# Patient Record
Sex: FEMALE | Race: WHITE | Employment: FULL TIME | URBAN - METROPOLITAN AREA
[De-identification: names, ages, dates, MRNs, and addresses within clinical notes are randomized per-mention and may not be internally consistent; named-entity substitution may affect disease eponyms.]

---

## 2022-12-28 ENCOUNTER — HOSPITAL ENCOUNTER (EMERGENCY)
Age: 22
Discharge: HOME | End: 2022-12-28
Attending: EMERGENCY MEDICINE
Payer: COMMERCIAL

## 2022-12-28 ENCOUNTER — APPOINTMENT (OUTPATIENT)
Dept: GENERAL RADIOLOGY | Age: 22
End: 2022-12-28
Payer: COMMERCIAL

## 2022-12-28 VITALS
HEART RATE: 84 BPM | DIASTOLIC BLOOD PRESSURE: 74 MMHG | HEIGHT: 64 IN | RESPIRATION RATE: 18 BRPM | BODY MASS INDEX: 33.57 KG/M2 | WEIGHT: 196.6 LBS | OXYGEN SATURATION: 100 % | TEMPERATURE: 98.5 F | SYSTOLIC BLOOD PRESSURE: 125 MMHG

## 2022-12-28 DIAGNOSIS — M25.512 ACUTE PAIN OF LEFT SHOULDER: Primary | ICD-10-CM

## 2022-12-28 DIAGNOSIS — M85.60 BONE CYST: ICD-10-CM

## 2022-12-28 PROCEDURE — 6370000000 HC RX 637 (ALT 250 FOR IP): Performed by: EMERGENCY MEDICINE

## 2022-12-28 PROCEDURE — 99283 EMERGENCY DEPT VISIT LOW MDM: CPT

## 2022-12-28 PROCEDURE — 73030 X-RAY EXAM OF SHOULDER: CPT

## 2022-12-28 RX ORDER — HYDROCODONE BITARTRATE AND ACETAMINOPHEN 5; 325 MG/1; MG/1
1 TABLET ORAL
Status: COMPLETED | OUTPATIENT
Start: 2022-12-28 | End: 2022-12-28

## 2022-12-28 RX ORDER — HYDROCODONE BITARTRATE AND ACETAMINOPHEN 5; 325 MG/1; MG/1
1 TABLET ORAL 2 TIMES DAILY PRN
Qty: 6 TABLET | Refills: 0 | Status: SHIPPED | OUTPATIENT
Start: 2022-12-28 | End: 2022-12-31

## 2022-12-28 RX ADMIN — HYDROCODONE BITARTRATE AND ACETAMINOPHEN 1 TABLET: 5; 325 TABLET ORAL at 13:47

## 2022-12-28 ASSESSMENT — PAIN - FUNCTIONAL ASSESSMENT: PAIN_FUNCTIONAL_ASSESSMENT: 0-10

## 2022-12-28 ASSESSMENT — ENCOUNTER SYMPTOMS
SHORTNESS OF BREATH: 0
VOMITING: 0

## 2022-12-28 ASSESSMENT — PAIN SCALES - GENERAL: PAINLEVEL_OUTOF10: 5
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
## 2022-12-28 NOTE — ED NOTES
I have reviewed discharge instructions with the patient. The patient verbalized understanding. Patient left ED via Discharge Method: ambulatory to Home with family. Opportunity for questions and clarification provided. Patient given 1 scripts. To continue your aftercare when you leave the hospital, you may receive an automated call from our care team to check in on how you are doing. This is a free service and part of our promise to provide the best care and service to meet your aftercare needs.  If you have questions, or wish to unsubscribe from this service please call [phone]. Thank you for Choosing our [facility] Emergency Department.         Anna Jacome RN  12/28/22 8286

## 2022-12-29 ENCOUNTER — APPOINTMENT (OUTPATIENT)
Dept: GENERAL RADIOLOGY | Age: 22
End: 2022-12-29
Payer: COMMERCIAL

## 2022-12-29 ENCOUNTER — HOSPITAL ENCOUNTER (EMERGENCY)
Age: 22
Discharge: HOME | End: 2022-12-30
Attending: EMERGENCY MEDICINE | Admitting: EMERGENCY MEDICINE
Payer: COMMERCIAL

## 2022-12-29 DIAGNOSIS — M89.9 LESION OF HUMERUS: Primary | ICD-10-CM

## 2022-12-29 LAB
ALBUMIN SERPL-MCNC: 3.8 G/DL (ref 3.5–5)
ALBUMIN/GLOB SERPL: 1 {RATIO} (ref 0.4–1.6)
ALP SERPL-CCNC: 83 U/L (ref 50–136)
ALT SERPL-CCNC: 21 U/L (ref 12–65)
ANION GAP SERPL CALC-SCNC: 5 MMOL/L (ref 2–11)
AST SERPL-CCNC: 13 U/L (ref 15–37)
BASOPHILS # BLD: 0 K/UL (ref 0–0.2)
BASOPHILS NFR BLD: 0 % (ref 0–2)
BILIRUB SERPL-MCNC: 0.3 MG/DL (ref 0.2–1.1)
BUN SERPL-MCNC: 12 MG/DL (ref 6–23)
CALCIUM SERPL-MCNC: 9.7 MG/DL (ref 8.3–10.4)
CHLORIDE SERPL-SCNC: 105 MMOL/L (ref 101–110)
CO2 SERPL-SCNC: 30 MMOL/L (ref 21–32)
CREAT SERPL-MCNC: 0.8 MG/DL (ref 0.6–1)
DIFFERENTIAL METHOD BLD: ABNORMAL
EOSINOPHIL # BLD: 0.2 K/UL (ref 0–0.8)
EOSINOPHIL NFR BLD: 1 % (ref 0.5–7.8)
ERYTHROCYTE [DISTWIDTH] IN BLOOD BY AUTOMATED COUNT: 12.9 % (ref 11.9–14.6)
FLUAV AG NPH QL IA: NEGATIVE
FLUBV AG NPH QL IA: NEGATIVE
GLOBULIN SER CALC-MCNC: 3.9 G/DL (ref 2.8–4.5)
GLUCOSE SERPL-MCNC: 102 MG/DL (ref 65–100)
HCT VFR BLD AUTO: 39.2 % (ref 35.8–46.3)
HGB BLD-MCNC: 13.1 G/DL (ref 11.7–15.4)
IMM GRANULOCYTES # BLD AUTO: 0.1 K/UL (ref 0–0.5)
IMM GRANULOCYTES NFR BLD AUTO: 1 % (ref 0–5)
LACTATE SERPL-SCNC: 0.9 MMOL/L (ref 0.4–2)
LYMPHOCYTES # BLD: 2.3 K/UL (ref 0.5–4.6)
LYMPHOCYTES NFR BLD: 17 % (ref 13–44)
MCH RBC QN AUTO: 28.7 PG (ref 26.1–32.9)
MCHC RBC AUTO-ENTMCNC: 33.4 G/DL (ref 31.4–35)
MCV RBC AUTO: 86 FL (ref 82–102)
MONOCYTES # BLD: 1.3 K/UL (ref 0.1–1.3)
MONOCYTES NFR BLD: 10 % (ref 4–12)
NEUTS SEG # BLD: 10 K/UL (ref 1.7–8.2)
NEUTS SEG NFR BLD: 71 % (ref 43–78)
NRBC # BLD: 0 K/UL (ref 0–0.2)
PLATELET # BLD AUTO: 377 K/UL (ref 150–450)
PMV BLD AUTO: 9.8 FL (ref 9.4–12.3)
POTASSIUM SERPL-SCNC: 4 MMOL/L (ref 3.5–5.1)
PROCALCITONIN SERPL-MCNC: <0.05 NG/ML (ref 0–0.49)
PROT SERPL-MCNC: 7.7 G/DL (ref 6.3–8.2)
RBC # BLD AUTO: 4.56 M/UL (ref 4.05–5.2)
SARS-COV-2 RDRP RESP QL NAA+PROBE: NOT DETECTED
SODIUM SERPL-SCNC: 140 MMOL/L (ref 133–143)
SOURCE: NORMAL
SPECIMEN SOURCE: NORMAL
WBC # BLD AUTO: 14 K/UL (ref 4.3–11.1)

## 2022-12-29 PROCEDURE — 87635 SARS-COV-2 COVID-19 AMP PRB: CPT

## 2022-12-29 PROCEDURE — 71045 X-RAY EXAM CHEST 1 VIEW: CPT

## 2022-12-29 PROCEDURE — 87804 INFLUENZA ASSAY W/OPTIC: CPT

## 2022-12-29 PROCEDURE — 99285 EMERGENCY DEPT VISIT HI MDM: CPT

## 2022-12-29 PROCEDURE — 84145 PROCALCITONIN (PCT): CPT

## 2022-12-29 PROCEDURE — 86140 C-REACTIVE PROTEIN: CPT

## 2022-12-29 PROCEDURE — 87040 BLOOD CULTURE FOR BACTERIA: CPT

## 2022-12-29 PROCEDURE — 83605 ASSAY OF LACTIC ACID: CPT

## 2022-12-29 PROCEDURE — 96374 THER/PROPH/DIAG INJ IV PUSH: CPT

## 2022-12-29 PROCEDURE — 85025 COMPLETE CBC W/AUTO DIFF WBC: CPT

## 2022-12-29 PROCEDURE — 80053 COMPREHEN METABOLIC PANEL: CPT

## 2022-12-29 PROCEDURE — 93005 ELECTROCARDIOGRAM TRACING: CPT

## 2022-12-29 PROCEDURE — 96376 TX/PRO/DX INJ SAME DRUG ADON: CPT

## 2022-12-29 NOTE — Clinical Note
Shana Son was seen and treated in our emergency department on 12/29/2022. She may return to work on 01/05/2023. If you have any questions or concerns, please don't hesitate to call.       Ruddy Sam MD

## 2022-12-30 ENCOUNTER — APPOINTMENT (OUTPATIENT)
Dept: GENERAL RADIOLOGY | Age: 22
End: 2022-12-30
Payer: COMMERCIAL

## 2022-12-30 ENCOUNTER — APPOINTMENT (OUTPATIENT)
Dept: MRI IMAGING | Age: 22
End: 2022-12-30
Payer: COMMERCIAL

## 2022-12-30 VITALS
HEART RATE: 108 BPM | SYSTOLIC BLOOD PRESSURE: 131 MMHG | TEMPERATURE: 99.2 F | OXYGEN SATURATION: 96 % | DIASTOLIC BLOOD PRESSURE: 74 MMHG | RESPIRATION RATE: 16 BRPM

## 2022-12-30 LAB
APPEARANCE UR: CLEAR
BACTERIA URNS QL MICRO: 0 /HPF
BILIRUB UR QL: NEGATIVE
BILIRUB UR QL: NEGATIVE
COLOR UR: ABNORMAL
CRP SERPL-MCNC: 15.7 MG/DL (ref 0–0.9)
EKG ATRIAL RATE: 112 BPM
EKG DIAGNOSIS: NORMAL
EKG P AXIS: 51 DEGREES
EKG P-R INTERVAL: 150 MS
EKG Q-T INTERVAL: 304 MS
EKG QRS DURATION: 88 MS
EKG QTC CALCULATION (BAZETT): 414 MS
EKG R AXIS: 51 DEGREES
EKG T AXIS: 19 DEGREES
EKG VENTRICULAR RATE: 112 BPM
EPI CELLS #/AREA URNS HPF: ABNORMAL /HPF
GLUCOSE UR QL STRIP.AUTO: NEGATIVE MG/DL
GLUCOSE UR STRIP.AUTO-MCNC: NEGATIVE MG/DL
HGB UR QL STRIP: ABNORMAL
KETONES UR QL STRIP.AUTO: ABNORMAL MG/DL
KETONES UR-MCNC: ABNORMAL MG/DL
LEUKOCYTE ESTERASE UR QL STRIP.AUTO: NEGATIVE
LEUKOCYTE ESTERASE UR QL STRIP: NEGATIVE
NITRITE UR QL STRIP.AUTO: NEGATIVE
NITRITE UR QL: NEGATIVE
OTHER OBSERVATIONS: ABNORMAL
PH UR STRIP: 5.5 [PH] (ref 5–9)
PH UR: 5.5 [PH] (ref 5–9)
PROT UR QL: NEGATIVE MG/DL
PROT UR STRIP-MCNC: NEGATIVE MG/DL
RBC # UR STRIP: ABNORMAL /UL
SERVICE CMNT-IMP: ABNORMAL
SP GR UR REFRACTOMETRY: 1.01 (ref 1–1.02)
SP GR UR: 1.02 (ref 1–1.02)
UROBILINOGEN UR QL STRIP.AUTO: 0.2 EU/DL (ref 0.2–1)
UROBILINOGEN UR QL: 0.2 EU/DL (ref 0.2–1)
WBC URNS QL MICRO: ABNORMAL /HPF

## 2022-12-30 PROCEDURE — 6370000000 HC RX 637 (ALT 250 FOR IP)

## 2022-12-30 PROCEDURE — 2580000003 HC RX 258

## 2022-12-30 PROCEDURE — 6360000004 HC RX CONTRAST MEDICATION

## 2022-12-30 PROCEDURE — 73223 MRI JOINT UPR EXTR W/O&W/DYE: CPT

## 2022-12-30 PROCEDURE — 81001 URINALYSIS AUTO W/SCOPE: CPT

## 2022-12-30 PROCEDURE — 81003 URINALYSIS AUTO W/O SCOPE: CPT

## 2022-12-30 PROCEDURE — 73030 X-RAY EXAM OF SHOULDER: CPT

## 2022-12-30 PROCEDURE — A9579 GAD-BASE MR CONTRAST NOS,1ML: HCPCS

## 2022-12-30 PROCEDURE — 6360000002 HC RX W HCPCS

## 2022-12-30 RX ORDER — SODIUM CHLORIDE, SODIUM LACTATE, POTASSIUM CHLORIDE, AND CALCIUM CHLORIDE .6; .31; .03; .02 G/100ML; G/100ML; G/100ML; G/100ML
1000 INJECTION, SOLUTION INTRAVENOUS ONCE
Status: COMPLETED | OUTPATIENT
Start: 2022-12-30 | End: 2022-12-30

## 2022-12-30 RX ORDER — HYDROCODONE BITARTRATE AND ACETAMINOPHEN 7.5; 325 MG/1; MG/1
1 TABLET ORAL
Status: COMPLETED | OUTPATIENT
Start: 2022-12-30 | End: 2022-12-30

## 2022-12-30 RX ORDER — MORPHINE SULFATE 2 MG/ML
2 INJECTION, SOLUTION INTRAMUSCULAR; INTRAVENOUS
Status: COMPLETED | OUTPATIENT
Start: 2022-12-30 | End: 2022-12-30

## 2022-12-30 RX ORDER — HYDROCODONE BITARTRATE AND ACETAMINOPHEN 7.5; 325 MG/1; MG/1
1 TABLET ORAL EVERY 6 HOURS PRN
Qty: 12 TABLET | Refills: 0 | Status: SHIPPED | OUTPATIENT
Start: 2022-12-30 | End: 2023-01-02

## 2022-12-30 RX ADMIN — HYDROCODONE BITARTRATE AND ACETAMINOPHEN 1 TABLET: 7.5; 325 TABLET ORAL at 03:01

## 2022-12-30 RX ADMIN — SODIUM CHLORIDE, POTASSIUM CHLORIDE, SODIUM LACTATE AND CALCIUM CHLORIDE 1000 ML: 600; 310; 30; 20 INJECTION, SOLUTION INTRAVENOUS at 03:02

## 2022-12-30 RX ADMIN — GADOTERIDOL 18 ML: 279.3 INJECTION, SOLUTION INTRAVENOUS at 13:00

## 2022-12-30 RX ADMIN — MORPHINE SULFATE 2 MG: 2 INJECTION, SOLUTION INTRAMUSCULAR; INTRAVENOUS at 09:12

## 2022-12-30 RX ADMIN — MORPHINE SULFATE 2 MG: 2 INJECTION, SOLUTION INTRAMUSCULAR; INTRAVENOUS at 13:50

## 2022-12-30 ASSESSMENT — ENCOUNTER SYMPTOMS
SINUS PRESSURE: 0
SINUS PAIN: 0
EYE DISCHARGE: 0
TROUBLE SWALLOWING: 0
SORE THROAT: 0
SHORTNESS OF BREATH: 0
VOMITING: 0
GASTROINTESTINAL NEGATIVE: 1
NAUSEA: 0
EYE REDNESS: 0
BACK PAIN: 0
EYES NEGATIVE: 1
ABDOMINAL PAIN: 0
COUGH: 0
RHINORRHEA: 0
EYE PAIN: 0
COLOR CHANGE: 0
RESPIRATORY NEGATIVE: 1

## 2022-12-30 ASSESSMENT — PAIN SCALES - GENERAL
PAINLEVEL_OUTOF10: 8
PAINLEVEL_OUTOF10: 0
PAINLEVEL_OUTOF10: 6
PAINLEVEL_OUTOF10: 8

## 2022-12-30 ASSESSMENT — PAIN - FUNCTIONAL ASSESSMENT: PAIN_FUNCTIONAL_ASSESSMENT: 0-10

## 2022-12-30 ASSESSMENT — PAIN DESCRIPTION - ORIENTATION: ORIENTATION: LEFT

## 2022-12-30 ASSESSMENT — PAIN DESCRIPTION - LOCATION
LOCATION: SHOULDER
LOCATION: SHOULDER

## 2022-12-30 ASSESSMENT — PAIN DESCRIPTION - DESCRIPTORS: DESCRIPTORS: ACHING;DISCOMFORT;THROBBING
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
## 2022-12-30 NOTE — PROGRESS NOTES
Ortho on-call note:    MRI of the shoulder reviewed with Dr. Oscar Mg. We do not have coverage of orthopedic oncology at our facility. He recommends a referral to [facility] for further work-up and evaluation. I discussed this with the ER physician and recommended transfer of care to [facility]. EXAMINATION: MRI SHOULDER LEFT W WO CONTRAST 12/30/2022 1:26 PM       ACCESSION NUMBER: [id number]       COMPARISON: Left shoulder x-ray same date and 12/28/2022       INDICATION: Cortical lucency to left proximal humerus on x-ray       TECHNIQUE: Dedicated MRI of the left shoulder was performed with multiplanar   multiecho technique. Pre and postcontrast images performed. Postcontrast images   after administration of 18 mL ProHance. FINDINGS:       Bones: There is lobulated expansile lesion centrally within the humeral head. There is multifocal areas of cortical thinning with cortical   breakthrough/fracture of the posterior humeral head. There is perilesional edema   within the humeral diaphysis. Lesion is predominantly heterogenous T2   hyperintense and T1 hypointense. There are areas of intrinsic T1 hyperintensity   within the lesion as well as biceps tendon sheath likely component of blood   products. There is ill-defined peripheral enhancement of the lesion as well as   minimal enhancement of suggestion of internal septations within the lesion. Predominantly central nonenhancement of this lesion       Alignment: Inferior subluxation of the humeral head relation to glenoid likely   secondary to large joint effusion. Fluid:   Subacromial/Subdeltoid Bursa: Small fluid within the bursa. Glenohumeral Joint: Large joint effusion with synovitis and blood products   within the joint       Acromioclavicular Arch   Acromioclavicular Joint: Intact. No os acromiale. Subacromial Spur: Absent. Rotator Cuff: Supraspinatus and infraspinatus tendinosis without full-thickness   tear.  Subscapularis unremarkable. Biceps Tendon: Intact       Glenohumeral Joint:   Labrum: Intact   Cartilage: No full-thickness cartilage defect. Muscles: Edema and enhancement within rotator cuff musculature as well as   latissimus dorsi and deltoid. Possibly muscle strain or reactive from proximal   humeral lesion. Nerves: Edema about the joint capsule extends about the axillary nerve. No   discrete lesion within the quadrilateral space or course the suprascapular   nerve. Vessels: Unremarkable               Impression       Proximal humeral expansile lesion with superimposed pathologic fracture   posteriorly. Prominent edema and enhancement of the adjacent bone and soft   tissues with associated hemarthrosis likely posttraumatic in nature. Difficult   to assess aggressiveness of the underlying bone lesion in the setting of acute   traumatic changes. Although potentially benign lesion such as aneurysmal bone   cyst, giant cell tumor, or chondroblastoma more aggressive lesion such as   sarcoma is not excluded. Recommend orthopedic oncology consultation.

## 2022-12-30 NOTE — ED NOTES
Pt ambulatory to bathroom to collect urine specimen. Mother present for assistance if needed.      Yaz Francis RN  12/30/22 5529
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
## 2022-12-30 NOTE — ED NOTES
I have reviewed discharge instructions with the patient. The patient verbalized understanding. Patient left ED via Discharge Method: ambulatory to Home with self    Opportunity for questions and clarification provided. Patient given 1 scripts. To continue your aftercare when you leave the hospital, you may receive an automated call from our care team to check in on how you are doing. This is a free service and part of our promise to provide the best care and service to meet your aftercare needs.  If you have questions, or wish to unsubscribe from this service please call [phone]. Thank you for Choosing our [address] [place] [address] Emergency Department.          Eulalia Abrams RN  12/30/22 0035

## 2022-12-30 NOTE — DISCHARGE INSTRUCTIONS
Take milligrams of ibuprofen every 8 hours, use plenty of food and water. Use ice areas of pain. Keep the arm in a sling.   Follow-up with the orthopedic team.  The appointment is Thursday, January 5 at 11 AM  Address:   [address] [facility] [address] [place], [facility] [address]    If needed to reschedule please call Jodi Ross at  [phone]
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
## 2022-12-30 NOTE — PROGRESS NOTES
Ortho on-call note:    Chart reviewed. We are awaiting MRI of the shoulder to determine if there is any possibility of septic joint. This will also further evaluate the subchondral cyst that was noted. Patient does have elevated white count and CRP of unclear etiology. Dr. Lux Sanchez will be in house later this afternoon. I did speak with the ER physician and asked them to expedite the MRI of the shoulder. Unfortunately we cannot make any decision until the MRI is completed.

## 2023-01-01 LAB
BACTERIA SPEC CULT: NORMAL
SERVICE CMNT-IMP: NORMAL

## 2023-04-01 ENCOUNTER — HOSPITAL ENCOUNTER (EMERGENCY)
Age: 23
Discharge: HOME | End: 2023-04-01
Attending: EMERGENCY MEDICINE
Payer: COMMERCIAL

## 2023-04-01 ENCOUNTER — APPOINTMENT (OUTPATIENT)
Dept: GENERAL RADIOLOGY | Age: 23
End: 2023-04-01
Payer: COMMERCIAL

## 2023-04-01 VITALS
BODY MASS INDEX: 34.15 KG/M2 | TEMPERATURE: 98.8 F | SYSTOLIC BLOOD PRESSURE: 117 MMHG | HEIGHT: 64 IN | WEIGHT: 200 LBS | OXYGEN SATURATION: 99 % | HEART RATE: 87 BPM | DIASTOLIC BLOOD PRESSURE: 76 MMHG | RESPIRATION RATE: 18 BRPM

## 2023-04-01 DIAGNOSIS — R19.7 DIARRHEA, UNSPECIFIED TYPE: ICD-10-CM

## 2023-04-01 DIAGNOSIS — R11.2 NAUSEA AND VOMITING, UNSPECIFIED VOMITING TYPE: ICD-10-CM

## 2023-04-01 DIAGNOSIS — B34.9 VIRAL SYNDROME: Primary | ICD-10-CM

## 2023-04-01 LAB
ALBUMIN SERPL-MCNC: 3.7 G/DL (ref 3.5–5)
ALBUMIN/GLOB SERPL: 1 (ref 0.4–1.6)
ALP SERPL-CCNC: 70 U/L (ref 50–136)
ALT SERPL-CCNC: 24 U/L (ref 12–65)
ANION GAP SERPL CALC-SCNC: 4 MMOL/L (ref 2–11)
APPEARANCE UR: ABNORMAL
AST SERPL-CCNC: 10 U/L (ref 15–37)
BACTERIA URNS QL MICRO: ABNORMAL /HPF
BASOPHILS # BLD: 0 K/UL (ref 0–0.2)
BASOPHILS NFR BLD: 0 % (ref 0–2)
BILIRUB SERPL-MCNC: 0.3 MG/DL (ref 0.2–1.1)
BILIRUB UR QL: NEGATIVE
BILIRUB UR QL: NEGATIVE
BUN SERPL-MCNC: 7 MG/DL (ref 6–23)
CALCIUM SERPL-MCNC: 8.5 MG/DL (ref 8.3–10.4)
CHLORIDE SERPL-SCNC: 110 MMOL/L (ref 101–110)
CO2 SERPL-SCNC: 25 MMOL/L (ref 21–32)
COLOR UR: ABNORMAL
CREAT SERPL-MCNC: 0.7 MG/DL (ref 0.6–1)
DIFFERENTIAL METHOD BLD: ABNORMAL
EOSINOPHIL # BLD: 0.1 K/UL (ref 0–0.8)
EOSINOPHIL NFR BLD: 1 % (ref 0.5–7.8)
EPI CELLS #/AREA URNS HPF: ABNORMAL /HPF
ERYTHROCYTE [DISTWIDTH] IN BLOOD BY AUTOMATED COUNT: 13.6 % (ref 11.9–14.6)
FLUAV RNA SPEC QL NAA+PROBE: NOT DETECTED
FLUBV RNA SPEC QL NAA+PROBE: NOT DETECTED
GLOBULIN SER CALC-MCNC: 3.8 G/DL (ref 2.8–4.5)
GLUCOSE SERPL-MCNC: 92 MG/DL (ref 65–100)
GLUCOSE UR QL STRIP.AUTO: NEGATIVE MG/DL
GLUCOSE UR STRIP.AUTO-MCNC: NEGATIVE MG/DL
HCG UR QL: NEGATIVE
HCT VFR BLD AUTO: 42.1 % (ref 35.8–46.3)
HGB BLD-MCNC: 13.9 G/DL (ref 11.7–15.4)
HGB UR QL STRIP: ABNORMAL
IMM GRANULOCYTES # BLD AUTO: 0 K/UL (ref 0–0.5)
IMM GRANULOCYTES NFR BLD AUTO: 0 % (ref 0–5)
KETONES UR QL STRIP.AUTO: NEGATIVE MG/DL
KETONES UR-MCNC: NEGATIVE MG/DL
LEUKOCYTE ESTERASE UR QL STRIP.AUTO: ABNORMAL
LEUKOCYTE ESTERASE UR QL STRIP: NEGATIVE
LYMPHOCYTES # BLD: 1.5 K/UL (ref 0.5–4.6)
LYMPHOCYTES NFR BLD: 14 % (ref 13–44)
MAGNESIUM SERPL-MCNC: 2 MG/DL (ref 1.8–2.4)
MCH RBC QN AUTO: 28.3 PG (ref 26.1–32.9)
MCHC RBC AUTO-ENTMCNC: 33 G/DL (ref 31.4–35)
MCV RBC AUTO: 85.6 FL (ref 82–102)
MONOCYTES # BLD: 0.9 K/UL (ref 0.1–1.3)
MONOCYTES NFR BLD: 8 % (ref 4–12)
NEUTS SEG # BLD: 8.8 K/UL (ref 1.7–8.2)
NEUTS SEG NFR BLD: 77 % (ref 43–78)
NITRITE UR QL STRIP.AUTO: NEGATIVE
NITRITE UR QL: NEGATIVE
NRBC # BLD: 0 K/UL (ref 0–0.2)
PH UR STRIP: 6.5 (ref 5–9)
PH UR: 6.5 (ref 5–9)
PLATELET # BLD AUTO: 344 K/UL (ref 150–450)
PMV BLD AUTO: 9.6 FL (ref 9.4–12.3)
POTASSIUM SERPL-SCNC: 3.7 MMOL/L (ref 3.5–5.1)
PROT SERPL-MCNC: 7.5 G/DL (ref 6.3–8.2)
PROT UR QL: NEGATIVE MG/DL
PROT UR STRIP-MCNC: NEGATIVE MG/DL
RBC # BLD AUTO: 4.92 M/UL (ref 4.05–5.2)
RBC # UR STRIP: ABNORMAL
RBC #/AREA URNS HPF: ABNORMAL /HPF
SARS-COV-2 RDRP RESP QL NAA+PROBE: NOT DETECTED
SERVICE CMNT-IMP: ABNORMAL
SODIUM SERPL-SCNC: 139 MMOL/L (ref 133–143)
SOURCE: NORMAL
SP GR UR REFRACTOMETRY: 1.02 (ref 1–1.02)
SP GR UR: 1.02 (ref 1–1.02)
UROBILINOGEN UR QL STRIP.AUTO: 0.2 EU/DL (ref 0.2–1)
UROBILINOGEN UR QL: 0.2 EU/DL (ref 0.2–1)
WBC # BLD AUTO: 11.3 K/UL (ref 4.3–11.1)
WBC URNS QL MICRO: ABNORMAL /HPF

## 2023-04-01 PROCEDURE — 71046 X-RAY EXAM CHEST 2 VIEWS: CPT

## 2023-04-01 PROCEDURE — 96361 HYDRATE IV INFUSION ADD-ON: CPT

## 2023-04-01 PROCEDURE — 81025 URINE PREGNANCY TEST: CPT

## 2023-04-01 PROCEDURE — 87502 INFLUENZA DNA AMP PROBE: CPT

## 2023-04-01 PROCEDURE — 96374 THER/PROPH/DIAG INJ IV PUSH: CPT

## 2023-04-01 PROCEDURE — 2580000003 HC RX 258

## 2023-04-01 PROCEDURE — 85025 COMPLETE CBC W/AUTO DIFF WBC: CPT

## 2023-04-01 PROCEDURE — 81003 URINALYSIS AUTO W/O SCOPE: CPT

## 2023-04-01 PROCEDURE — 96375 TX/PRO/DX INJ NEW DRUG ADDON: CPT

## 2023-04-01 PROCEDURE — 6360000002 HC RX W HCPCS

## 2023-04-01 PROCEDURE — 81001 URINALYSIS AUTO W/SCOPE: CPT

## 2023-04-01 PROCEDURE — 99284 EMERGENCY DEPT VISIT MOD MDM: CPT

## 2023-04-01 PROCEDURE — 83735 ASSAY OF MAGNESIUM: CPT

## 2023-04-01 PROCEDURE — 80053 COMPREHEN METABOLIC PANEL: CPT

## 2023-04-01 PROCEDURE — 87635 SARS-COV-2 COVID-19 AMP PRB: CPT

## 2023-04-01 RX ORDER — ONDANSETRON 4 MG/1
4 TABLET, ORALLY DISINTEGRATING ORAL 3 TIMES DAILY PRN
Qty: 21 TABLET | Refills: 0 | Status: SHIPPED | OUTPATIENT
Start: 2023-04-01

## 2023-04-01 RX ORDER — 0.9 % SODIUM CHLORIDE 0.9 %
500 INTRAVENOUS SOLUTION INTRAVENOUS
Status: COMPLETED | OUTPATIENT
Start: 2023-04-01 | End: 2023-04-01

## 2023-04-01 RX ORDER — LOPERAMIDE HYDROCHLORIDE 2 MG/1
2 CAPSULE ORAL 4 TIMES DAILY PRN
Qty: 12 CAPSULE | Refills: 0 | Status: SHIPPED | OUTPATIENT
Start: 2023-04-01 | End: 2023-04-04

## 2023-04-01 RX ORDER — ONDANSETRON 2 MG/ML
4 INJECTION INTRAMUSCULAR; INTRAVENOUS
Status: COMPLETED | OUTPATIENT
Start: 2023-04-01 | End: 2023-04-01

## 2023-04-01 RX ORDER — KETOROLAC TROMETHAMINE 30 MG/ML
30 INJECTION, SOLUTION INTRAMUSCULAR; INTRAVENOUS
Status: COMPLETED | OUTPATIENT
Start: 2023-04-01 | End: 2023-04-01

## 2023-04-01 RX ORDER — ACETAMINOPHEN 325 MG/1
650 TABLET ORAL EVERY 4 HOURS PRN
Status: DISCONTINUED | OUTPATIENT
Start: 2023-04-01 | End: 2023-04-01 | Stop reason: HOSPADM

## 2023-04-01 RX ADMIN — KETOROLAC TROMETHAMINE 30 MG: 30 INJECTION, SOLUTION INTRAMUSCULAR at 12:43

## 2023-04-01 RX ADMIN — SODIUM CHLORIDE 500 ML: 9 INJECTION, SOLUTION INTRAVENOUS at 11:50

## 2023-04-01 RX ADMIN — ONDANSETRON 4 MG: 2 INJECTION INTRAMUSCULAR; INTRAVENOUS at 11:51

## 2023-04-01 ASSESSMENT — LIFESTYLE VARIABLES
HOW OFTEN DO YOU HAVE A DRINK CONTAINING ALCOHOL: NEVER
HOW MANY STANDARD DRINKS CONTAINING ALCOHOL DO YOU HAVE ON A TYPICAL DAY: PATIENT DOES NOT DRINK

## 2023-04-01 ASSESSMENT — PAIN SCALES - GENERAL
PAINLEVEL_OUTOF10: 3
PAINLEVEL_OUTOF10: 5

## 2023-04-01 ASSESSMENT — PAIN DESCRIPTION - LOCATION
LOCATION: HEAD
LOCATION: ABDOMEN

## 2023-04-01 ASSESSMENT — PAIN - FUNCTIONAL ASSESSMENT: PAIN_FUNCTIONAL_ASSESSMENT: 0-10

## 2023-04-01 NOTE — ED TRIAGE NOTES
Patient ambulatory to triage with CO NVD, fatigue, chills, low back pain x 3 days. Reports recently dx with tumor in arm, receiving  denosumab injections for.

## 2023-04-01 NOTE — ED NOTES
I have reviewed discharge instructions with the patient. The patient verbalized understanding. Patient left ED via Discharge Method: ambulatory to Home with family. Opportunity for questions and clarification provided. Patient given 0 scripts. To continue your aftercare when you leave the hospital, you may receive an automated call from our care team to check in on how you are doing. This is a free service and part of our promise to provide the best care and service to meet your aftercare needs.  If you have questions, or wish to unsubscribe from this service please call [phone]. Thank you for Choosing our [facility] Emergency Department.           Berna Shukla, RN  04/01/23 7050
General

## 2023-04-01 NOTE — DISCHARGE INSTRUCTIONS
There has been no emergent cause of your symptoms found at today's visit. Please take the Zofran and loperamide for nausea and diarrhea. Please continue to hydrate. Follow-up with your oncologist and your primary care provider in the next 2 to 3 days for reevaluation and to ensure improvement.   Please return if you begin to experience any uncontrolled fevers, uncontrolled vomiting, abdominal pain, etc.

## 2023-04-01 NOTE — ED PROVIDER NOTES
Emergency Department Provider Note                   PCP:                None Provider               Age: 25 y.o. Sex: female     DISPOSITION Decision To Discharge 04/01/2023 12:28:27 PM       ICD-10-CM    1. Viral syndrome  B34.9       2. Nausea and vomiting, unspecified vomiting type  R11.2       3. Diarrhea, unspecified type  R19.7           MEDICAL DECISION MAKING  Complexity of Problems Addressed:  Complexity of Problem: 1 acute, uncomplicated illness or injury. Data Reviewed and Analyzed:  Category 1:   I reviewed external records: ED visit note from an outside group. I reviewed external records: provider visit note from PCP. I reviewed external records: provider visit note from outside specialist.  I ordered each unique test.  I reviewed the results of each unique test.      Category 2:   I interpreted the X-rays. And agree there is no evidence of infiltrate, consolidation, cavitary lesions, etc.    Category 3: Discussion of management or test interpretation. 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 On presentation, patient did have a low-grade fever of 99 and was mildly tachycardic. After administration of Tylenol and fluids, her vital stabilized. Her abdomen was soft without distention, rigidity, or peritoneal signs. She did have mild diffuse tenderness but no focal points of tenderness, rebound, or guarding. .  She noted a cough but her lungs were clear to auscultation in all fields without crackles, wheezes, or other adventitious sounds. Chest x-ray was negative for any acute cardiopulmonary abnormalities. There is no evidence of nuchal rigidity or meningeal signs. Rapid flu and COVID were negative. CBC revealed a mild leukocytosis, but was otherwise unremarkable.   Urine dip revealed blood, however she is on her menstrual cycle as well

## 2024-12-03 ENCOUNTER — OFFICE VISIT (OUTPATIENT)
Dept: OBGYN CLINIC | Age: 24
End: 2024-12-03
Payer: COMMERCIAL

## 2024-12-03 VITALS
BODY MASS INDEX: 41.96 KG/M2 | DIASTOLIC BLOOD PRESSURE: 64 MMHG | SYSTOLIC BLOOD PRESSURE: 122 MMHG | HEIGHT: 64 IN | WEIGHT: 245.8 LBS

## 2024-12-03 DIAGNOSIS — N93.9 ABNORMAL UTERINE BLEEDING (AUB): Primary | ICD-10-CM

## 2024-12-03 PROCEDURE — 99203 OFFICE O/P NEW LOW 30 MIN: CPT | Performed by: STUDENT IN AN ORGANIZED HEALTH CARE EDUCATION/TRAINING PROGRAM

## 2024-12-03 RX ORDER — GABAPENTIN 300 MG/1
300 CAPSULE ORAL NIGHTLY
COMMUNITY
Start: 2024-09-03

## 2024-12-03 NOTE — PROGRESS NOTES
Angelina Kuhn (: 2000) is a 23 y.o. , New patient, here for evaluation of the following chief complaint(s):    New Patient      HPI:  Here for amenorrhea x5 months. 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 Periods became irregular after her surgery. She has not had a period since 25, before that she had periods in January and February but not again until May. Saw provider in [place] but periods were not discussed, she has concerns about not having periods. Pt mother on OCPs for a while before she delivered patient, had poor reaction to OCPs (psychological reaction), pt and mom concerned that this could happen with patient as well. Patient reports hx of mental health issues.     States that she is sexually active, has taken pregnancy tests and they have been negative, last test a few days ago.     ASSESSMENT/PLAN:  1. Abnormal uterine bleeding (AUB)  Overview:  12/3/24: Here for amenorrhea x5 months. 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 Periods became irregular after her surgery. She denies chemotherapy or radiation treatment. She has not had a period since 25, before that she had periods in January and February but not again until May. Patient reports hx of mental health issues and is concerned about birth control's affects on her mood. States that she is sexually active, has taken pregnancy tests and they have been negative, last test a few days ago.   24 labs: normal FSH/LH, estradiol, AMH 10.9, TSH mildly elevated 4.6, normal T4, HbA1c 5.6  Plan:  Obtain AUB labs  TVUS  Orders:  -     17-OH Progesterone, LC/MS; Future  -     Prolactin; Future  -     Testosterone, free, total; Future  -     TSH; Future       Return in about 2 weeks

## 2025-01-16 ENCOUNTER — OFFICE VISIT (OUTPATIENT)
Dept: OBGYN CLINIC | Age: 25
End: 2025-01-16
Payer: COMMERCIAL

## 2025-01-16 ENCOUNTER — PROCEDURE VISIT (OUTPATIENT)
Dept: OBGYN CLINIC | Age: 25
End: 2025-01-16
Payer: COMMERCIAL

## 2025-01-16 VITALS
DIASTOLIC BLOOD PRESSURE: 82 MMHG | SYSTOLIC BLOOD PRESSURE: 136 MMHG | BODY MASS INDEX: 42.24 KG/M2 | HEIGHT: 64 IN | WEIGHT: 247.4 LBS

## 2025-01-16 DIAGNOSIS — N93.9 ABNORMAL UTERINE BLEEDING (AUB): Primary | ICD-10-CM

## 2025-01-16 DIAGNOSIS — E28.2 PCOS (POLYCYSTIC OVARIAN SYNDROME): ICD-10-CM

## 2025-01-16 PROCEDURE — 99213 OFFICE O/P EST LOW 20 MIN: CPT | Performed by: STUDENT IN AN ORGANIZED HEALTH CARE EDUCATION/TRAINING PROGRAM

## 2025-01-16 PROCEDURE — 76830 TRANSVAGINAL US NON-OB: CPT | Performed by: STUDENT IN AN ORGANIZED HEALTH CARE EDUCATION/TRAINING PROGRAM

## 2025-01-16 RX ORDER — NORGESTIMATE AND ETHINYL ESTRADIOL 0.25-0.035
1 KIT ORAL DAILY
Qty: 3 PACKET | Refills: 0 | Status: SHIPPED | OUTPATIENT
Start: 2025-01-16

## 2025-01-16 NOTE — PROGRESS NOTES
Angelina Kuhn (: 2000) is a 24 y.o. , Established patient, here for evaluation of the following chief complaint(s):    Ultrasound      HPI:  Here for ultrasound follow up for abnormal uterine bleeding. She was seen on 12/3/24 as a new patient and had c/o amenorrhea x5 months.     US Findings:  GYN US PERFORMED SECONDARY TO AUB, AMENORRHEA X 6 MO   CX APPEARS WNL   UTERUS IS ANTEVERTED AND INHOMOGENEOUS   ENDO= 7.0MM ,NO INTRACAVITARY MASSES VISUALIZED   ROV IS ENLARGED WITH MULTIPLE FOLLICLES AND HAS APPEARANCE OF PCOS   LOV IS ENLARGED WITH MULTIPLE FOLLICLES AND HAS APPEARANCE OF PCOS   BILATERAL ADN APPEAR WNL     ASSESSMENT/PLAN:  1. Abnormal uterine bleeding (AUB)  Overview:  12/3/24: Here for amenorrhea x5 months. 2 years ago had surgery, prior to that was having treatments at [facility] for tumor (chondroblastoma of shoulder), surgery included plates, pins, cadaver bone. Periods became irregular after her surgery. She denies chemotherapy or radiation treatment. She has not had a period since 25, before that she had periods in January and February but not again until May. Patient reports hx of mental health issues and is concerned about birth control's affects on her mood. States that she is sexually active, has taken pregnancy tests and they have been negative, last test a few days ago.   24 labs: normal FSH/LH, estradiol, AMH 10.9, TSH mildly elevated 4.6, normal T4, HbA1c 5.6  Plan:  Obtain AUB labs  TVUS    25: TVUS reviewed, c/w PCOS, otherwise wnl. Diagnosis c/w PCOS due to irregular cycles and polycystic ovaries on US. Pt reports still no period since last visit.  Discussed PCOS as a disorder of hormone imbalance. Discussed long-term consequences including issues with infertility and increased risk for diabetes and dyslipidemia. Discussed treatment is OCPs for cycle regulation and endometrial protection. She has never been on birth control before. Pt has no c/I to

## 2025-04-17 ENCOUNTER — OFFICE VISIT (OUTPATIENT)
Dept: OBGYN CLINIC | Age: 25
End: 2025-04-17
Payer: COMMERCIAL

## 2025-04-17 VITALS
DIASTOLIC BLOOD PRESSURE: 82 MMHG | SYSTOLIC BLOOD PRESSURE: 118 MMHG | WEIGHT: 248 LBS | HEIGHT: 64 IN | BODY MASS INDEX: 42.34 KG/M2

## 2025-04-17 DIAGNOSIS — E28.2 PCOS (POLYCYSTIC OVARIAN SYNDROME): ICD-10-CM

## 2025-04-17 DIAGNOSIS — F43.21 SITUATIONAL DEPRESSION: ICD-10-CM

## 2025-04-17 DIAGNOSIS — N93.9 ABNORMAL UTERINE BLEEDING (AUB): Primary | ICD-10-CM

## 2025-04-17 PROCEDURE — 99213 OFFICE O/P EST LOW 20 MIN: CPT | Performed by: STUDENT IN AN ORGANIZED HEALTH CARE EDUCATION/TRAINING PROGRAM

## 2025-04-17 RX ORDER — NORGESTIMATE AND ETHINYL ESTRADIOL 0.25-0.035
1 KIT ORAL DAILY
Qty: 3 PACKET | Refills: 1 | Status: SHIPPED | OUTPATIENT
Start: 2025-04-17

## 2025-04-17 RX ORDER — ALBUTEROL SULFATE 90 UG/1
INHALANT RESPIRATORY (INHALATION)
COMMUNITY
Start: 2025-04-14

## 2025-04-17 RX ORDER — CETIRIZINE HYDROCHLORIDE 10 MG/1
10 TABLET ORAL DAILY
COMMUNITY
Start: 2025-04-02

## 2025-04-17 ASSESSMENT — PATIENT HEALTH QUESTIONNAIRE - PHQ9
SUM OF ALL RESPONSES TO PHQ QUESTIONS 1-9: 1
1. LITTLE INTEREST OR PLEASURE IN DOING THINGS: NOT AT ALL
2. FEELING DOWN, DEPRESSED OR HOPELESS: SEVERAL DAYS

## 2025-04-17 NOTE — PROGRESS NOTES
Angelina Kuhn (: 2000) is a 24 y.o. , Established patient, here for evaluation of the following chief complaint(s):    Follow-up      HPI:  3 month follow up after starting sprintec for PCOS. She is taking OCPs and having periods. No side effects or concerns with medicine. Does want to have referral to therapist for recent family issues.     ASSESSMENT/PLAN:  1. Abnormal uterine bleeding (AUB)  Overview:  12/3/24: Here for amenorrhea x5 months. 2 years ago had surgery, prior to that was having treatments at [facility] for tumor (chondroblastoma of shoulder), surgery included plates, pins, cadaver bone. Periods became irregular after her surgery. She denies chemotherapy or radiation treatment. She has not had a period since 25, before that she had periods in January and February but not again until May. Patient reports hx of mental health issues and is concerned about birth control's affects on her mood. States that she is sexually active, has taken pregnancy tests and they have been negative, last test a few days ago.   24 labs: normal FSH/LH, estradiol, AMH 10.9, TSH mildly elevated 4.6, normal T4, HbA1c 5.6  Plan:  Obtain AUB labs  TVUS    25: TVUS reviewed, c/w PCOS, otherwise wnl. Diagnosis c/w PCOS due to irregular cycles and polycystic ovaries on US. Pt reports still no period since last visit.  Discussed PCOS as a disorder of hormone imbalance. Discussed long-term consequences including issues with infertility and increased risk for diabetes and dyslipidemia. Discussed treatment is OCPs for cycle regulation and endometrial protection. She has never been on birth control before. Pt has no c/I to estrogen use.  Plan:  Start Sprintec    25: currently taking Sprintec for 3 months. She is taking OCPs and having periods. No side effects or concerns with medicine.   Plan:  Continue SPrintec  Orders:  -     norgestimate-ethinyl estradiol (SPRINTEC 28) 0.25-35 MG-MCG per